# Patient Record
Sex: MALE | Race: OTHER | HISPANIC OR LATINO | URBAN - METROPOLITAN AREA
[De-identification: names, ages, dates, MRNs, and addresses within clinical notes are randomized per-mention and may not be internally consistent; named-entity substitution may affect disease eponyms.]

---

## 2022-07-15 ENCOUNTER — EMERGENCY (EMERGENCY)
Facility: HOSPITAL | Age: 37
LOS: 1 days | Discharge: HOME | End: 2022-07-15
Admitting: EMERGENCY MEDICINE
Payer: COMMERCIAL

## 2022-07-15 VITALS
OXYGEN SATURATION: 99 % | HEIGHT: 71 IN | WEIGHT: 181 LBS | TEMPERATURE: 98 F | HEART RATE: 61 BPM | RESPIRATION RATE: 18 BRPM | DIASTOLIC BLOOD PRESSURE: 91 MMHG | SYSTOLIC BLOOD PRESSURE: 144 MMHG

## 2022-07-15 PROCEDURE — 99283 EMERGENCY DEPT VISIT LOW MDM: CPT | Mod: 25

## 2022-07-15 PROCEDURE — 99284 EMERGENCY DEPT VISIT MOD MDM: CPT

## 2022-07-15 PROCEDURE — 96372 THER/PROPH/DIAG INJ SC/IM: CPT

## 2022-07-15 RX ORDER — LIDOCAINE 4 G/100G
1 CREAM TOPICAL
Qty: 7 | Refills: 0
Start: 2022-07-15 | End: 2022-07-21

## 2022-07-15 RX ORDER — IBUPROFEN 200 MG
1 TABLET ORAL
Qty: 15 | Refills: 0
Start: 2022-07-15 | End: 2022-07-19

## 2022-07-15 RX ORDER — KETOROLAC TROMETHAMINE 30 MG/ML
30 SYRINGE (ML) INJECTION ONCE
Refills: 0 | Status: DISCONTINUED | OUTPATIENT
Start: 2022-07-15 | End: 2022-07-15

## 2022-07-15 RX ORDER — CYCLOBENZAPRINE HYDROCHLORIDE 10 MG/1
1 TABLET, FILM COATED ORAL
Qty: 15 | Refills: 0
Start: 2022-07-15 | End: 2022-07-19

## 2022-07-15 RX ORDER — CYCLOBENZAPRINE HYDROCHLORIDE 10 MG/1
10 TABLET, FILM COATED ORAL ONCE
Refills: 0 | Status: COMPLETED | OUTPATIENT
Start: 2022-07-15 | End: 2022-07-15

## 2022-07-15 RX ORDER — LIDOCAINE 4 G/100G
1 CREAM TOPICAL ONCE
Refills: 0 | Status: COMPLETED | OUTPATIENT
Start: 2022-07-15 | End: 2022-07-15

## 2022-07-15 RX ADMIN — Medication 30 MILLIGRAM(S): at 15:27

## 2022-07-15 RX ADMIN — LIDOCAINE 1 PATCH: 4 CREAM TOPICAL at 15:27

## 2022-07-15 RX ADMIN — CYCLOBENZAPRINE HYDROCHLORIDE 10 MILLIGRAM(S): 10 TABLET, FILM COATED ORAL at 15:27

## 2022-07-15 NOTE — ED ADULT NURSE NOTE - OBJECTIVE STATEMENT
Pt presented to ED with c/o of non radiating back pain since yesterday. Affirms being unable to put own shoes, back being "locked". AOX4. VSS.  Patient denies chest pain, discomfort, shortness of breath, difficulty breathing and any form of distress not noted. Patient oriented to ED area. All needs attended. Rounding in progress. Fall risk precautions maintained.

## 2022-07-15 NOTE — ED PROVIDER NOTE - CLINICAL SUMMARY MEDICAL DECISION MAKING FREE TEXT BOX
pt c/o lbp since yest after bending down to pick something up, no blunt trauma or fall, no bowel or bladder incontinence, no saddle anesthesia, no concern for cord compression or cauda equina, suspect msk, given toradol/flexeril and lidoderm patch, requesting work excuse - provided, will dc w/rx for nsaids and muscle relaxants, to f/u [facility]

## 2022-07-15 NOTE — ED PROVIDER NOTE - MUSCULOSKELETAL, MLM
no spinal tend, no rash, no discolorations, FROM, + L paralumbar tend, normal gait, LE w/FROM b/l, muscle strength 5/5 b/l, good resistance b/l, + light touch b/l

## 2022-07-15 NOTE — ED PROVIDER NOTE - OBJECTIVE STATEMENT
The pt is a 36 y/o M, who presents to ED c/o LBP since yest after bending down. Pain is constant, dull, aggravated w/mov, non radiating, 6/10, took an asa yest but no meds taken today. Denies fall, trauma, numbness or tingling to toes, loss of bowel or bladder control, saddle anesthesia, fevers, chills.

## 2022-07-15 NOTE — ED ADULT TRIAGE NOTE - CHIEF COMPLAINT QUOTE
Pt presents to ED c/o L lower back pain s/p bending over to pick something up yesterday. Denies numbness, tingling, loss of bladder, bowel.

## 2022-07-15 NOTE — ED PROVIDER NOTE - PATIENT PORTAL LINK FT
You can access the FollowMyHealth Patient Portal offered by [facility] by registering at the following website: [email or web address] By joining [employer]’s FollowMyHealth portal, you will also be able to view your health information using other applications (apps) compatible with our system.

## 2022-07-18 DIAGNOSIS — M54.50 LOW BACK PAIN, UNSPECIFIED: ICD-10-CM

## 2025-09-15 ENCOUNTER — APPOINTMENT (OUTPATIENT)
Dept: OPHTHALMOLOGY | Facility: CLINIC | Age: 40
End: 2025-09-15